# Patient Record
Sex: FEMALE | Race: BLACK OR AFRICAN AMERICAN | NOT HISPANIC OR LATINO | Employment: FULL TIME | URBAN - METROPOLITAN AREA
[De-identification: names, ages, dates, MRNs, and addresses within clinical notes are randomized per-mention and may not be internally consistent; named-entity substitution may affect disease eponyms.]

---

## 2017-12-27 ENCOUNTER — ALLSCRIPTS OFFICE VISIT (OUTPATIENT)
Dept: OTHER | Facility: OTHER | Age: 22
End: 2017-12-27

## 2017-12-28 NOTE — PROGRESS NOTES
Assessment   1  Irregular menstrual cycle (626 4) (N92 6)    Plan   Irregular menstrual cycle    · Tri-Sprintec 0 18/0 215/0 25 MG-35 MCG Oral Tablet; Take one tablet po daily at the    same time   Rx By: Rocky Estrella; Dispense: 0 Days ; #:28 Tablet; Refill: 0;For: Irregular menstrual cycle; MEY = Y; Verified Transmission to [id number] Last Updated By: System, Padloc; 12/27/2017 10:25:15 AM    Discussion/Summary   Discussion Summary:    Will continue tri sprintec as directed and monitor vaginal bleeding  Decreased life stress  Exercise minimum of 150 mins per week  If bleeding persists, will complete pelvic exam (deferred today), possibly thyroid study, TA TV US and/ or vaginal culture  Counseling Documentation With Imm: The patient was counseled regarding instructions for management,-- patient and family education,-- impressions,-- importance of compliance with treatment  total time of encounter was 20 minutes-- and-- 15 minutes was spent counseling  Chief Complaint   Chief Complaint Free Text Note Form: Patient complains of irregular menses  History of Present Illness   HPI: New patient here for problem visit  States that her menses has been irregular x 6 weeks  Unpredictable timing and flow  Taking AMINAH daily x 8-9 year but states the pharmacy changed the birth control to some other brand at the time of her bleeding changing  She went back to her usual birth control 3 days later  She was nauseated with wrong pill  Menarche 5  Coitarche 16 consensual  Never non consensual sex  Lifetime # of sexual partners-7 males  No STI hx  Current male partner x 2 5 years  Previously disliked DMPA due to weight gain and AUB  Tried many other AMINAH's and tri Yoslein Sensor has worked the [employer] [facility] gyn appt was one year ago  Normal pap  No unusual vaginal discharge  Admits to high stress related to school ending, working full time, living with grandparents        Review of Systems   Focused-Female: Constitutional: No fever, no chills, feels well, no tiredness, no recent weight gain or loss  Cardiovascular: no complaints of slow or fast heart rate, no chest pain, no palpitations, no leg claudication or lower extremity edema  Respiratory: no complaints of shortness of breath, no wheezing, no dyspnea on exertion, no orthopnea or PND  Breasts: no complaints of breast pain, breast lump or nipple discharge  Gastrointestinal: no complaints of abdominal pain, no constipation, no nausea or diarrhea, no vomiting, no bloody stools  Genitourinary: unexplained vaginal bleeding, but-- no complaints of dysuria, no incontinence, no pelvic pain, no dysmenorrhea, no vaginal discharge or abnormal vaginal bleeding-- and-- as noted in HPI  Musculoskeletal: no complaints of arthralgia, no myalgia, no joint swelling or stiffness, no limb pain or swelling  Integumentary: no complaints of skin rash or lesion, no itching or dry skin, no skin wounds  Neurological: no complaints of headache, no confusion, no numbness or tingling, no dizziness or fainting  ROS Reviewed:    ROS reviewed  Active Problems   1  Acute viral pharyngitis (462) (J02 8,B97 89)   2  Anxiety (300 00) (F41 9)   3  Birth control counseling (V25 09) (Z30 09)   4  Herpes simplex infection (054 9) (B00 9)   5  Lump of skin (782 2) (R22 9)   6  Need for prophylactic vaccination and inoculation against influenza (V04 81) (Z23)   7  Urinary tract infection (599 0) (N39 0)    Past Medical History   Active Problems And Past Medical History Reviewed: The active problems and past medical history were reviewed and updated today  Surgical History   1  Birth control counseling (V25 09) (Z30 09)   2  History of Oral Surgery Tooth Extraction  Surgical History Reviewed: The surgical history was reviewed and updated today  Family History   Grandmother    1   Family history of chronic obstructive pulmonary disease (V17 6) (Z82 5)  Maternal Grandmother    2  Family history of Asthma (V17 5)  Grandfather    3  Family history of malignant neoplasm of urinary bladder (V16 52) (Z80 52)  Family History    4  Diabetes Mellitus (V18 0)  Family History Reviewed: The family history was reviewed and updated today  NO contact with dad      Social History    · Alcohol use (V49 89) (Z78 9)   · Currently sexually active   · Daily caffeine consumption   · Denied: History of drug use   · Never a smoker  Social History Reviewed: The social history was reviewed and updated today  Current Meds    1  Tri-Sprintec 0 18/0 215/0 25 MG-35 MCG Oral Tablet; Therapy: (Recorded:[id number]) to Recorded  Medication List Reviewed: The medication list was reviewed and updated today  Allergies   1  Acetaminophen PM Ex St TABS  2  Apples    Vitals   Vital Signs    Recorded: [id number] 58:52CL   Systolic 374, LUE, Sitting   Diastolic 72, LUE, Sitting   Height 5 ft 3 in   Weight 144 lb 2 oz   BMI Calculated 25 53   BSA Calculated 1 68   LMP [id number]     Physical Exam   deferred         Signatures    Electronically signed by :  JOJO Christie; Dec 27 2017 10:37AM EST                       (Author)     Electronically signed by : ELVER Cummings ; Dec 27 2017 12:52PM EST                       (Author)

## 2018-01-22 VITALS
DIASTOLIC BLOOD PRESSURE: 72 MMHG | BODY MASS INDEX: 25.54 KG/M2 | SYSTOLIC BLOOD PRESSURE: 108 MMHG | HEIGHT: 63 IN | WEIGHT: 144.13 LBS

## 2018-01-23 NOTE — MISCELLANEOUS
Message  ALEXX WAS SEEN HERE TODAY IN OUR OFFICE TODAY 12/27/2017   Return to work or school:   Felisha Gamboa is under my professional care  She was seen in my office on 12/27/17   She is able to return to work on  She is able to return to school on  She is able to perform activities of daily living without limitations  Aakash URIBE  Signatures  KRISTIN [address]    Electronically signed by :  JOJO Kelly; Dec 27 2017 10:39AM EST                       (Author)

## 2018-05-02 ENCOUNTER — TELEPHONE (OUTPATIENT)
Dept: GYNECOLOGY | Facility: CLINIC | Age: 23
End: 2018-05-02

## 2018-05-03 NOTE — TELEPHONE ENCOUNTER
I prescribed that medication on 12/27/17  I only prescribed one month because she is due for her annual exam and deferred an exam that day  I'm sorry but do not recall when she will be leaving or where to

## 2018-05-07 RX ORDER — NORGESTIMATE AND ETHINYL ESTRADIOL 7DAYSX3 28
KIT ORAL
Qty: 28 TABLET | Refills: 0 | OUTPATIENT
Start: 2018-05-07

## 2018-05-11 ENCOUNTER — TELEPHONE (OUTPATIENT)
Dept: GYNECOLOGY | Facility: CLINIC | Age: 23
End: 2018-05-11

## 2018-05-14 ENCOUNTER — ANNUAL EXAM (OUTPATIENT)
Dept: GYNECOLOGY | Facility: CLINIC | Age: 23
End: 2018-05-14
Payer: COMMERCIAL

## 2018-05-14 VITALS
HEART RATE: 74 BPM | SYSTOLIC BLOOD PRESSURE: 128 MMHG | HEIGHT: 63 IN | DIASTOLIC BLOOD PRESSURE: 76 MMHG | BODY MASS INDEX: 25.98 KG/M2 | WEIGHT: 146.6 LBS

## 2018-05-14 DIAGNOSIS — Z01.419 ENCNTR FOR GYN EXAM (GENERAL) (ROUTINE) W/O ABN FINDINGS: Primary | ICD-10-CM

## 2018-05-14 DIAGNOSIS — Z11.3 ROUTINE SCREENING FOR STI (SEXUALLY TRANSMITTED INFECTION): ICD-10-CM

## 2018-05-14 DIAGNOSIS — Z30.011 ENCOUNTER FOR PRESCRIPTION OF ORAL CONTRACEPTIVES: ICD-10-CM

## 2018-05-14 PROCEDURE — G0145 SCR C/V CYTO,THINLAYER,RESCR: HCPCS | Performed by: NURSE PRACTITIONER

## 2018-05-14 PROCEDURE — 87491 CHLMYD TRACH DNA AMP PROBE: CPT | Performed by: NURSE PRACTITIONER

## 2018-05-14 PROCEDURE — 87591 N.GONORRHOEAE DNA AMP PROB: CPT | Performed by: NURSE PRACTITIONER

## 2018-05-14 PROCEDURE — S0612 ANNUAL GYNECOLOGICAL EXAMINA: HCPCS | Performed by: NURSE PRACTITIONER

## 2018-05-14 RX ORDER — NORGESTIMATE AND ETHINYL ESTRADIOL 7DAYSX3 28
KIT ORAL
COMMUNITY
End: 2018-05-14 | Stop reason: SDUPTHER

## 2018-05-14 RX ORDER — NORGESTIMATE AND ETHINYL ESTRADIOL 7DAYSX3 28
1 KIT ORAL DAILY
Qty: 84 TABLET | Refills: 3 | Status: SHIPPED | OUTPATIENT
Start: 2018-05-14 | End: 2019-06-06 | Stop reason: SDUPTHER

## 2018-05-14 RX ORDER — ACYCLOVIR 200 MG/1
CAPSULE ORAL
COMMUNITY
Start: 2017-03-29 | End: 2019-07-12

## 2018-05-14 RX ORDER — EPINEPHRINE 0.3 MG/.3ML
INJECTION SUBCUTANEOUS
COMMUNITY
Start: 2017-04-24 | End: 2019-07-12

## 2018-05-14 RX ORDER — NORGESTIMATE AND ETHINYL ESTRADIOL 7DAYSX3 28
1 KIT ORAL DAILY
Qty: 84 TABLET | Refills: 3 | Status: SHIPPED | OUTPATIENT
Start: 2018-05-14 | End: 2018-05-14 | Stop reason: SDUPTHER

## 2018-05-14 NOTE — PATIENT INSTRUCTIONS
Pap every 3 year if normal-done, STI testing as indicated-done, exercise most days of week, appropriate diet and hydration, Calcium 1000mg + 600 vit D daily, birth control as directed (ACHES reviewed)  Benefits, risks and alternatives discussed/reviewed  HPV vaccine if indicated  Annual mammogram starting at age 36, monthly BSE  Kegels 20 times twice daily

## 2018-05-14 NOTE — PROGRESS NOTES
Assessment/Plan:    Pap every 3 year if normal-done, STI testing as indicated-done, exercise most days of week, appropriate diet and hydration, Calcium 1000mg + 600 vit D daily, birth control as directed (ACHES reviewed)  Benefits, risks and alternatives discussed/reviewed  HPV vaccine if indicated  Annual mammogram starting at age 35, monthly BSE  Kegels 20 times twice daily  Diagnoses and all orders for this visit:    Encntr for gyn exam (general) (routine) w/o abn findings  -     Liquid-based pap, screening    Encounter for prescription of oral contraceptives  -     Discontinue: norgestimate-ethinyl estradiol (TRI-SPRINTEC) 0 18/0 215/0 25 MG-35 MCG per tablet; Take 1 tablet by mouth daily  -     norgestimate-ethinyl estradiol (TRI-SPRINTEC) 0 18/0 215/0 25 MG-35 MCG per tablet; Take 1 tablet by mouth daily    Routine screening for STI (sexually transmitted infection)  -     Chlamydia/GC amplified DNA by PCR    Other orders  -     acyclovir (ZOVIRAX) 200 mg capsule; TAKE 1 CAPSULE 3 TIMES A DAY FOR 10 DAYS  -     EPINEPHrine (EPIPEN 2-ANGELA) 0 3 mg/0 3 mL SOAJ; For a severe reaction: Inject in outer thigh following instructions on package and go to the Emergency room  -     Discontinue: norgestimate-ethinyl estradiol (TRI-SPRINTEC) 0 18/0 215/0 25 MG-35 MCG per tablet; Take by mouth          Subjective:      Patient ID: Ilene Mustafa is a 20 y o  female  Patient here for annual visit  No health concerns  Moving to [facility] for [facility]  Works for [employer]  Monthly menses x 3-4 days with heavy flow x 1 day then mod  Sexually active with male partner x 3 years  He is also moving with her and he got a job in [employer]  Admits to extreme anxiety to "anything vagina related " I almost pass out with pelvic exam  No anxiety with intimate contact with partner  Hx of HSV around her eyes which is triggered by stress             The following portions of the patient's history were reviewed and updated as appropriate: allergies, current medications, past family history, past medical history, past social history, past surgical history and problem list     Review of Systems   Constitutional: Negative  Negative for activity change, appetite change, chills, diaphoresis, fatigue, fever and unexpected weight change  HENT: Negative for congestion, dental problem, sneezing, sore throat and trouble swallowing  Eyes: Negative for visual disturbance  Respiratory: Negative for chest tightness and shortness of breath  Cardiovascular: Negative for chest pain and leg swelling  Gastrointestinal: Negative for abdominal pain, constipation, diarrhea, nausea and vomiting  Genitourinary: Negative for difficulty urinating, dyspareunia, dysuria, frequency, hematuria, menstrual problem, pelvic pain, urgency, vaginal bleeding, vaginal discharge and vaginal pain  Musculoskeletal: Negative for back pain and neck pain  Skin: Negative  Allergic/Immunologic: Negative  Neurological: Negative for weakness and headaches  Hematological: Negative for adenopathy  Psychiatric/Behavioral: Negative  Objective: There were no vitals taken for this visit  Physical Exam   Constitutional: She is oriented to person, place, and time  She appears well-developed and well-nourished  Extremely guarded during pelvic exam  Utilized breathing techniques to get through exam     HENT:   Head: Normocephalic and atraumatic  Eyes: Right eye exhibits no discharge  Left eye exhibits no discharge  Neck: Normal range of motion  Neck supple  Cardiovascular: Normal rate, regular rhythm, normal heart sounds and intact distal pulses  Pulmonary/Chest: Effort normal and breath sounds normal    Abdominal: Soft  Genitourinary: Vagina normal and uterus normal  Rectal exam shows no external hemorrhoid  No breast swelling, tenderness, discharge or bleeding  No labial fusion   There is no rash, tenderness, lesion or injury on the right labia  There is no rash, tenderness, lesion or injury on the left labia  Cervix exhibits no motion tenderness, no discharge and no friability  Right adnexum displays no mass, no tenderness and no fullness  Left adnexum displays no mass, no tenderness and no fullness  No erythema, tenderness or bleeding in the vagina  No foreign body in the vagina  No signs of injury around the vagina  No vaginal discharge found  Genitourinary Comments: Partial view of cervix  Pap attempted  Patient aware we may have insufficient cells  Musculoskeletal: Normal range of motion  Lymphadenopathy:     She has no cervical adenopathy  Right: No inguinal adenopathy present  Left: No inguinal adenopathy present  Neurological: She is alert and oriented to person, place, and time  Skin: Skin is warm and dry  Psychiatric: She has a normal mood and affect  Nursing note and vitals reviewed

## 2018-05-16 LAB
CHLAMYDIA DNA CVX QL NAA+PROBE: NORMAL
N GONORRHOEA DNA GENITAL QL NAA+PROBE: NORMAL

## 2018-05-17 LAB
LAB AP GYN PRIMARY INTERPRETATION: NORMAL
Lab: NORMAL

## 2018-08-28 ENCOUNTER — TELEPHONE (OUTPATIENT)
Dept: GYNECOLOGY | Facility: CLINIC | Age: 23
End: 2018-08-28

## 2018-09-03 ENCOUNTER — DOCUMENTATION (OUTPATIENT)
Dept: LABOR AND DELIVERY | Facility: HOSPITAL | Age: 23
End: 2018-09-03

## 2018-09-03 DIAGNOSIS — Z30.011 ENCOUNTER FOR PRESCRIPTION OF ORAL CONTRACEPTIVES: ICD-10-CM

## 2019-02-04 ENCOUNTER — TELEPHONE (OUTPATIENT)
Dept: GYNECOLOGY | Facility: CLINIC | Age: 24
End: 2019-02-04

## 2019-02-04 NOTE — TELEPHONE ENCOUNTER
Patient aware you will be in office tomorrow, she would like to speak with you about her BCP  Thinks she may have sexual side effects with this

## 2019-02-05 ENCOUNTER — TELEPHONE (OUTPATIENT)
Dept: GYNECOLOGY | Facility: CLINIC | Age: 24
End: 2019-02-05

## 2019-02-05 NOTE — TELEPHONE ENCOUNTER
Spoke with patient about her concerns about decreased libido-discussed causes and suggestions given to increase desire

## 2019-04-16 DIAGNOSIS — Z30.011 ENCOUNTER FOR PRESCRIPTION OF ORAL CONTRACEPTIVES: ICD-10-CM

## 2019-04-17 DIAGNOSIS — Z30.011 ENCOUNTER FOR PRESCRIPTION OF ORAL CONTRACEPTIVES: ICD-10-CM

## 2019-04-17 RX ORDER — NORGESTIMATE AND ETHINYL ESTRADIOL 7DAYSX3 28
KIT ORAL
Qty: 84 TABLET | Refills: 3 | OUTPATIENT
Start: 2019-04-17

## 2019-05-28 DIAGNOSIS — Z30.011 ENCOUNTER FOR PRESCRIPTION OF ORAL CONTRACEPTIVES: ICD-10-CM

## 2019-05-29 RX ORDER — NORGESTIMATE AND ETHINYL ESTRADIOL 7DAYSX3 28
KIT ORAL
Qty: 28 TABLET | Refills: 0 | OUTPATIENT
Start: 2019-05-29

## 2019-06-06 DIAGNOSIS — Z30.011 ENCOUNTER FOR PRESCRIPTION OF ORAL CONTRACEPTIVES: ICD-10-CM

## 2019-06-06 RX ORDER — NORGESTIMATE AND ETHINYL ESTRADIOL 7DAYSX3 28
1 KIT ORAL DAILY
Qty: 28 TABLET | Refills: 0 | Status: SHIPPED | OUTPATIENT
Start: 2019-06-06 | End: 2019-07-11 | Stop reason: SDUPTHER

## 2019-06-06 RX ORDER — NORGESTIMATE AND ETHINYL ESTRADIOL 7DAYSX3 28
KIT ORAL
Qty: 28 TABLET | Refills: 0 | OUTPATIENT
Start: 2019-06-06

## 2019-07-12 ENCOUNTER — ANNUAL EXAM (OUTPATIENT)
Dept: GYNECOLOGY | Facility: CLINIC | Age: 24
End: 2019-07-12
Payer: COMMERCIAL

## 2019-07-12 ENCOUNTER — OFFICE VISIT (OUTPATIENT)
Dept: INTERNAL MEDICINE CLINIC | Facility: CLINIC | Age: 24
End: 2019-07-12
Payer: COMMERCIAL

## 2019-07-12 VITALS
HEIGHT: 63 IN | DIASTOLIC BLOOD PRESSURE: 76 MMHG | SYSTOLIC BLOOD PRESSURE: 118 MMHG | WEIGHT: 140.4 LBS | HEART RATE: 75 BPM | BODY MASS INDEX: 24.88 KG/M2

## 2019-07-12 VITALS
BODY MASS INDEX: 24.63 KG/M2 | TEMPERATURE: 98.4 F | OXYGEN SATURATION: 98 % | HEIGHT: 63 IN | HEART RATE: 70 BPM | SYSTOLIC BLOOD PRESSURE: 128 MMHG | DIASTOLIC BLOOD PRESSURE: 84 MMHG | WEIGHT: 139 LBS

## 2019-07-12 DIAGNOSIS — Z00.00 ROUTINE GENERAL MEDICAL EXAMINATION AT A HEALTH CARE FACILITY: ICD-10-CM

## 2019-07-12 DIAGNOSIS — T78.2XXS ANAPHYLAXIS, SEQUELA: Primary | ICD-10-CM

## 2019-07-12 DIAGNOSIS — Z30.011 ENCOUNTER FOR PRESCRIPTION OF ORAL CONTRACEPTIVES: ICD-10-CM

## 2019-07-12 DIAGNOSIS — Z01.419 ENCNTR FOR GYN EXAM (GENERAL) (ROUTINE) W/O ABN FINDINGS: Primary | ICD-10-CM

## 2019-07-12 DIAGNOSIS — Z11.3 ROUTINE SCREENING FOR STI (SEXUALLY TRANSMITTED INFECTION): ICD-10-CM

## 2019-07-12 PROCEDURE — 99395 PREV VISIT EST AGE 18-39: CPT | Performed by: NURSE PRACTITIONER

## 2019-07-12 PROCEDURE — S0612 ANNUAL GYNECOLOGICAL EXAMINA: HCPCS | Performed by: NURSE PRACTITIONER

## 2019-07-12 PROCEDURE — 87591 N.GONORRHOEAE DNA AMP PROB: CPT | Performed by: NURSE PRACTITIONER

## 2019-07-12 PROCEDURE — 87491 CHLMYD TRACH DNA AMP PROBE: CPT | Performed by: NURSE PRACTITIONER

## 2019-07-12 RX ORDER — NORGESTIMATE AND ETHINYL ESTRADIOL 7DAYSX3 28
1 KIT ORAL DAILY
Qty: 84 TABLET | Refills: 3 | Status: SHIPPED | OUTPATIENT
Start: 2019-07-12 | End: 2020-04-10 | Stop reason: SDUPTHER

## 2019-07-12 RX ORDER — EPINEPHRINE 0.3 MG/.3ML
0.3 INJECTION SUBCUTANEOUS ONCE
Qty: 0.6 ML | Refills: 0 | Status: SHIPPED | OUTPATIENT
Start: 2019-07-12 | End: 2020-07-17 | Stop reason: SDUPTHER

## 2019-07-12 NOTE — PROGRESS NOTES
ADULT ANNUAL PHYSICAL  [facility] - [facility] INTERNAL MEDICINE    NAME: Melanie Huffman  AGE: 24 y o  SEX: female  : 1995     DATE: 2019     Assessment and Plan:     Problem List Items Addressed This Visit        Other    Routine general medical examination at a health care facility     Normal exam of healthy adult female  She is utd with gyn care and cervical cancer screenings  Regular dental care and eye exams  Encouraged beginning regular exercise program to maintain her healthy bmi  Immunizations utd  Other Visit Diagnoses     Anaphylaxis, sequela    -  Primary    Relevant Medications    EPINEPHrine (EPIPEN) 0 3 mg/0 3 mL SOAJ          Immunizations and preventive care screenings were discussed with patient today  Appropriate education was printed on patient's after visit summary  Counseling:  · Injury prevention: discussed safety/seat belts, safety helmets, smoke detectors, carbon dioxide detectors, and smoking near bedding or upholstery  No follow-ups on file  Chief Complaint:     Chief Complaint   Patient presents with   Brando Roles Establish Care    Sciatica      History of Present Illness:     Adult Annual Physical   Patient here for a comprehensive physical exam  The patient reports no problems  Diet and Physical Activity  · Diet/Nutrition: well balanced diet and consuming 3-5 servings of fruits/vegetables daily  · Exercise: no formal exercise  Depression Screening  PHQ-9 Depression Screening    PHQ-9:    Frequency of the following problems over the past two weeks:       Little interest or pleasure in doing things:  1 - several days  Feeling down, depressed, or hopeless:  1 - several days  PHQ-2 Score:  2       General Health  · Sleep: sleeps well and gets 4-6 hours of sleep on average  · Hearing: normal - bilateral   · Vision: no vision problems, most recent eye exam <1 year ago and wears glasses     · Dental: regular dental visits and brushes teeth twice daily  /GYN Health  · Last menstrual period: 7/3/19  · Contraceptive method: oral contraceptives  · History of STDs?: no      Review of Systems:     Review of Systems   Constitutional: Negative for activity change, appetite change, chills, fatigue, fever and unexpected weight change  HENT: Negative for hearing loss  Eyes: Negative for visual disturbance  Respiratory: Negative for cough, chest tightness and shortness of breath  Cardiovascular: Negative for chest pain, palpitations and leg swelling  Gastrointestinal: Negative for constipation, diarrhea, nausea and vomiting  Genitourinary: Negative for dysuria, frequency and menstrual problem  Musculoskeletal: Negative for arthralgias and myalgias  Allergic/Immunologic: Negative for environmental allergies  Neurological: Negative for dizziness, weakness, numbness and headaches  Psychiatric/Behavioral: Negative for dysphoric mood and sleep disturbance  The patient is not nervous/anxious  Past Medical History:     Past Medical History:   Diagnosis Date    Allergic     Anxiety       Past Surgical History:     Past Surgical History:   Procedure Laterality Date    WISDOM TOOTH EXTRACTION      WISDOM TOOTH EXTRACTION        Social History:     Social History     Socioeconomic History    Marital status: Single     Spouse name: None    Number of children: None    Years of education: None    Highest education level: None   Occupational History    None   Social Needs    Financial resource strain: None    Food insecurity:     Worry: None     Inability: None    Transportation needs:     Medical: None     Non-medical: None   Tobacco Use    Smoking status: Never Smoker    Smokeless tobacco: Never Used   Substance and Sexual Activity    Alcohol use: Yes     Comment: occas  , typically drinks on weekend; 5 beers on one day    Drug use: No    Sexual activity: Yes     Partners: Male     Birth control/protection: OCP     Comment: Male partner x 3 years; Keith   Lifestyle    Physical activity:     Days per week: None     Minutes per session: None    Stress: None   Relationships    Social connections:     Talks on phone: None     Gets together: None     Attends [employer] service: None     Active member of club or organization: None     Attends meetings of clubs or organizations: None     Relationship status: None    Intimate partner violence:     Fear of current or ex partner: None     Emotionally abused: None     Physically abused: None     Forced sexual activity: None   Other Topics Concern    None   Social History Narrative    Lives with her boyfriend    Works full time     Exercise: none reported    Diet: well balanced diet during the week    Caffeine: 3 cups coffee per day       Family History:     Family History   Problem Relation Age of Onset    No Known Problems Mother     No Known Problems Father     No Known Problems Sister     No Known Problems Brother     COPD Maternal Grandmother     Asthma Maternal Grandmother     Melanoma Maternal Grandmother     Cancer Maternal Grandfather         Bladder    Melanoma Maternal Grandfather     Diabetes Maternal Grandfather     No Known Problems Paternal Grandmother     No Known Problems Paternal Grandfather     No Known Problems Sister     No Known Problems Sister       Current Medications:     Current Outpatient Medications   Medication Sig Dispense Refill    norgestimate-ethinyl estradiol (TRI-SPRINTEC) 0 18/0 215/0 25 MG-35 MCG per tablet Take 1 tablet by mouth daily 84 tablet 3    EPINEPHrine (EPIPEN) 0 3 mg/0 3 mL SOAJ Inject 0 3 mL (0 3 mg total) into a muscle once for 1 dose 0 6 mL 0     No current facility-administered medications for this visit  Allergies:      Allergies   Allergen Reactions    Acetaminophen Hives     Any blue dye tyenlol, regular tylenol without dye is fine    Apple Itching    Methylphenidate Other (See Comments)    Other      Grass, tree nuts       Physical Exam:     /84 (BP Location: Left arm, Patient Position: Sitting, Cuff Size: Adult)   Pulse 70   Temp 98 4 °F (36 9 °C) (Tympanic)   Ht 5' 3" (1 6 m)   Wt 63 kg (139 lb)   LMP 07/03/2019   SpO2 98%   BMI 24 62 kg/m²     Physical Exam   Constitutional: She is oriented to person, place, and time  Vital signs are normal  She appears well-developed and well-nourished  She is cooperative  HENT:   Right Ear: Hearing, tympanic membrane, external ear and ear canal normal    Left Ear: Hearing, tympanic membrane, external ear and ear canal normal    Nose: Nose normal  No mucosal edema  Mouth/Throat: Uvula is midline, oropharynx is clear and moist and mucous membranes are normal    Eyes: Pupils are equal, round, and reactive to light  Conjunctivae and lids are normal    Neck: Normal range of motion  No JVD present  Carotid bruit is not present  No thyromegaly present  Cardiovascular: Normal rate, regular rhythm, normal heart sounds and intact distal pulses  No murmur heard  Pulmonary/Chest: Effort normal and breath sounds normal  No respiratory distress  Abdominal: Soft  Normal appearance and bowel sounds are normal  There is no tenderness  Musculoskeletal: Normal range of motion  She exhibits no edema  Lymphadenopathy:     She has no cervical adenopathy  Neurological: She is alert and oriented to person, place, and time  She has normal strength and normal reflexes  She displays normal reflexes  No sensory deficit  Skin: Skin is warm, dry and intact  Psychiatric: She has a normal mood and affect  Her speech is normal and behavior is normal  Judgment and thought content normal  Cognition and memory are normal    Vitals reviewed        Marita Haywood, [address] INTERNAL MEDICINE

## 2019-07-12 NOTE — PROGRESS NOTES
Assessment/Plan:    No problem-specific Assessment & Plan notes found for this encounter  There are no diagnoses linked to this encounter  Subjective:      Patient ID: Sami Matos is a 24 y o  female      HPI    The following portions of the patient's history were reviewed and updated as appropriate: allergies, current medications, past family history, past medical history, past social history, past surgical history and problem list     Review of Systems      Objective:      /84 (BP Location: Left arm, Patient Position: Sitting, Cuff Size: Adult)   Pulse 70   Temp 98 4 °F (36 9 °C) (Tympanic)   Ht 5' 3" (1 6 m)   Wt 63 kg (139 lb)   LMP 07/03/2019   SpO2 98%   BMI 24 62 kg/m²          Physical Exam

## 2019-07-12 NOTE — PATIENT INSTRUCTIONS
Pap every 3 years if normal-due 2021, STI testing as indicated-GC/CT done, exercise most days of week, appropriate diet and hydration, Calcium 1000mg + 600 vit D daily, birth control as directed (ACHES reviewed)  Benefits, risks and alternatives discussed/reviewed  HPV vaccine series possibly completed  Annual mammogram starting at age 36, monthly BSE  Will consider referral to PF PT for persistent gluteal pain if not relieved by other methods  She will follow with PCP today to discuss

## 2019-07-12 NOTE — PROGRESS NOTES
Assessment/Plan:     Pap every 3 years if normal-due 2021, STI testing as indicated-GC/CT done, exercise most days of week, appropriate diet and hydration, Calcium 1000mg + 600 vit D daily, birth control as directed (ACHES reviewed)  Benefits, risks and alternatives discussed/reviewed  HPV vaccine series possibly completed  Annual mammogram starting at age 35, monthly BSE  Will consider referral to PF PT for persistent gluteal pain if not relieved by other methods  She will follow with PCP today to discuss  Negative depression screen  Diagnoses and all orders for this visit:    Encntr for gyn exam (general) (routine) w/o abn findings    Encounter for prescription of oral contraceptives  -     norgestimate-ethinyl estradiol (TRI-SPRINTEC) 0 18/0 215/0 25 MG-35 MCG per tablet; Take 1 tablet by mouth daily    Routine screening for STI (sexually transmitted infection)  -     Chlamydia/GC amplified DNA by PCR    Other orders  -     Cancel: Liquid-based pap, screening              Subjective:      Patient ID: Sasha Carty is a 24 y o  female  Sasha Carty is a 24 y o  female who is here today for her annual visit  C/o right intermittent gluteal discomfort at random time  Pain described as sharp, poking rating pain 6/10  No meds taken  Walking lessens pain  Aware to follow up with PCP  Monthly menses x 4 days with 1 heavy day, then light with increased clots flow  Menses is tolerable but different  Denies menstrual cramps  Sasha Carty is sexually active with male partner of 3 5 years  Not using condoms, No hx of STI's  Normal pap 5/14/18  Exercises 2 times per month  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  The following portions of the patient's history were reviewed and updated as appropriate: allergies, current medications, past family history, past medical history, past social history, past surgical history and problem list     Review of Systems   Constitutional: Negative  Negative for activity change, appetite change, chills, diaphoresis, fatigue, fever and unexpected weight change  HENT: Positive for congestion (admits to seasonal allergies, no meds taken)  Negative for dental problem, sneezing, sore throat and trouble swallowing  C/o allergies/ all year round  Pt does not take anything for the allergies    Eyes: Positive for visual disturbance (followed with opthal  and had new eye glass prescription)  C/o blurry vision, has seen eye doctor recently and prescribed new glasses    Respiratory: Negative for chest tightness and shortness of breath  Cardiovascular: Negative for chest pain and leg swelling  Gastrointestinal: Negative for abdominal pain, constipation, diarrhea, nausea and vomiting  Genitourinary: Negative for difficulty urinating, dyspareunia, dysuria, frequency, hematuria, menstrual problem, pelvic pain, urgency, vaginal bleeding, vaginal discharge and vaginal pain  Musculoskeletal: Positive for back pain  Negative for neck pain  C/o lower right buttock pain occasionally  6-7/10 upon waking, seems to get better with ambulation     Skin: Negative  Allergic/Immunologic: Negative  Neurological: Negative for weakness and headaches  Hematological: Negative for adenopathy  Psychiatric/Behavioral: Negative  Objective:      /76 (BP Location: Left arm, Patient Position: Sitting, Cuff Size: Standard)   Pulse 75   Ht 5' 3" (1 6 m)   Wt 63 7 kg (140 lb 6 4 oz)   LMP 07/03/2019   BMI 24 87 kg/m²          Physical Exam   Constitutional: She is oriented to person, place, and time  Vital signs are normal  She appears well-developed and well-nourished  HENT:   Head: Normocephalic and atraumatic  Eyes: Right eye exhibits no discharge  Left eye exhibits no discharge  Neck: Trachea normal and normal range of motion  Neck supple  No thyromegaly present     Cardiovascular: Normal rate, regular rhythm, normal heart sounds and intact distal pulses  Pulmonary/Chest: Effort normal and breath sounds normal  Right breast exhibits no inverted nipple, no mass, no nipple discharge, no skin change and no tenderness  Left breast exhibits no inverted nipple, no mass, no nipple discharge, no skin change and no tenderness  No breast tenderness, discharge or bleeding  Breasts are symmetrical    Abdominal: Soft  Normal appearance  Genitourinary: Vagina normal and uterus normal  Rectal exam shows no external hemorrhoid  No breast tenderness, discharge or bleeding  Pelvic exam was performed with patient supine  No labial fusion  There is no rash, tenderness, lesion or injury on the right labia  There is no rash, tenderness, lesion or injury on the left labia  Cervix exhibits no motion tenderness, no discharge and no friability  Right adnexum displays no mass, no tenderness and no fullness  Left adnexum displays no mass, no tenderness and no fullness  No erythema, tenderness or bleeding in the vagina  No foreign body in the vagina  No signs of injury around the vagina  No vaginal discharge found  Genitourinary Comments: Guarding during pelvic exam; needing relaxation techniques to get through the discomfort   Musculoskeletal: Normal range of motion  Lymphadenopathy:        Head (right side): No submental, no submandibular and no tonsillar adenopathy present  Head (left side): No submental, no submandibular and no tonsillar adenopathy present  She has no cervical adenopathy  She has no axillary adenopathy  No inguinal adenopathy noted on the right or left side  Right: No inguinal adenopathy present  Left: No inguinal adenopathy present  Neurological: She is alert and oriented to person, place, and time  Skin: Skin is warm and dry  Psychiatric: She has a normal mood and affect  Nursing note and vitals reviewed

## 2019-07-12 NOTE — ASSESSMENT & PLAN NOTE
Normal exam of healthy adult female  She is utd with gyn care and cervical cancer screenings  Regular dental care and eye exams  Encouraged beginning regular exercise program to maintain her healthy bmi  Immunizations utd

## 2019-07-15 LAB
C TRACH DNA SPEC QL NAA+PROBE: NEGATIVE
N GONORRHOEA DNA SPEC QL NAA+PROBE: NEGATIVE

## 2019-10-21 ENCOUNTER — APPOINTMENT (OUTPATIENT)
Dept: LAB | Age: 24
End: 2019-10-21
Attending: PREVENTIVE MEDICINE

## 2019-10-21 ENCOUNTER — TRANSCRIBE ORDERS (OUTPATIENT)
Dept: ADMINISTRATIVE | Age: 24
End: 2019-10-21

## 2019-10-21 DIAGNOSIS — Z02.1 ENCOUNTER FOR PRE-EMPLOYMENT HEALTH SCREENING EXAMINATION: Primary | ICD-10-CM

## 2019-10-21 DIAGNOSIS — Z02.1 ENCOUNTER FOR PRE-EMPLOYMENT HEALTH SCREENING EXAMINATION: ICD-10-CM

## 2019-10-21 LAB — RUBV IGG SERPL IA-ACNC: 8.7 IU/ML

## 2019-10-21 PROCEDURE — 36415 COLL VENOUS BLD VENIPUNCTURE: CPT

## 2019-10-21 PROCEDURE — 86787 VARICELLA-ZOSTER ANTIBODY: CPT

## 2019-10-21 PROCEDURE — 86480 TB TEST CELL IMMUN MEASURE: CPT

## 2019-10-21 PROCEDURE — 86735 MUMPS ANTIBODY: CPT

## 2019-10-21 PROCEDURE — 86765 RUBEOLA ANTIBODY: CPT

## 2019-10-21 PROCEDURE — 86762 RUBELLA ANTIBODY: CPT

## 2019-10-22 LAB
MEV IGG SER QL: NORMAL
MUV IGG SER QL: ABNORMAL

## 2019-10-23 LAB
GAMMA INTERFERON BACKGROUND BLD IA-ACNC: 0.07 IU/ML
M TB IFN-G BLD-IMP: NEGATIVE
M TB IFN-G CD4+ BCKGRND COR BLD-ACNC: -0.03 IU/ML
M TB IFN-G CD4+ BCKGRND COR BLD-ACNC: -0.03 IU/ML
MITOGEN IGNF BCKGRD COR BLD-ACNC: >10 IU/ML

## 2019-10-24 LAB — VZV IGG SER IA-ACNC: NORMAL

## 2020-02-14 ENCOUNTER — OFFICE VISIT (OUTPATIENT)
Dept: URGENT CARE | Age: 25
End: 2020-02-14
Payer: COMMERCIAL

## 2020-02-14 VITALS
HEART RATE: 90 BPM | DIASTOLIC BLOOD PRESSURE: 65 MMHG | WEIGHT: 140 LBS | BODY MASS INDEX: 24.8 KG/M2 | RESPIRATION RATE: 16 BRPM | HEIGHT: 63 IN | OXYGEN SATURATION: 98 % | TEMPERATURE: 99.7 F | SYSTOLIC BLOOD PRESSURE: 119 MMHG

## 2020-02-14 DIAGNOSIS — J02.0 STREP PHARYNGITIS: Primary | ICD-10-CM

## 2020-02-14 PROCEDURE — G0382 LEV 3 HOSP TYPE B ED VISIT: HCPCS | Performed by: PHYSICIAN ASSISTANT

## 2020-02-14 RX ORDER — AMOXICILLIN 500 MG/1
500 CAPSULE ORAL EVERY 8 HOURS SCHEDULED
Qty: 30 CAPSULE | Refills: 0 | Status: SHIPPED | OUTPATIENT
Start: 2020-02-14 | End: 2020-02-24

## 2020-02-14 NOTE — PROGRESS NOTES
[address] [facility]        NAME: Hosea Wadsworth is a 21 y o  female  : 1995    MRN: [id number]  DATE: 2020  TIME: 3:11 PM    Assessment and Plan   Strep pharyngitis [J02 0]  1  Strep pharyngitis  amoxicillin (AMOXIL) 500 mg capsule         Patient Instructions       Follow up with PCP in 3-5 days  Proceed to  ER if symptoms worsen  Chief Complaint   No chief complaint on file  History of Present Illness       Patient here for evaluation redness swelling and exudate of her left tonsil with swollen glands  Patient's symptoms started yesterday and she fell little bit tired and fatigued  She denies any known fevers, chills, body aches, headache, cough  Review of Systems   Review of Systems   Constitutional: Negative  HENT: Positive for sore throat  Negative for congestion, ear pain, postnasal drip, rhinorrhea, sinus pressure, sinus pain, sneezing, trouble swallowing and voice change  Eyes: Negative  Respiratory: Negative  Cardiovascular: Negative            Current Medications       Current Outpatient Medications:     amoxicillin (AMOXIL) 500 mg capsule, Take 1 capsule (500 mg total) by mouth every 8 (eight) hours for 10 days, Disp: 30 capsule, Rfl: 0    EPINEPHrine (EPIPEN) 0 3 mg/0 3 mL SOAJ, Inject 0 3 mL (0 3 mg total) into a muscle once for 1 dose, Disp: 0 6 mL, Rfl: 0    norgestimate-ethinyl estradiol (TRI-SPRINTEC) 0 18/0 215/0 25 MG-35 MCG per tablet, Take 1 tablet by mouth daily, Disp: 84 tablet, Rfl: 3    Current Allergies     Allergies as of 2020 - Reviewed 2019   Allergen Reaction Noted    Acetaminophen Hives 2016    Apple Itching 2016    Methylphenidate Other (See Comments) 2017    Other  2018            The following portions of the patient's history were reviewed and updated as appropriate: allergies, current medications, past family history, past medical history, past social history, past surgical history and problem list      Past Medical History:   Diagnosis Date    Allergic     Anxiety        Past Surgical History:   Procedure Laterality Date    WISDOM TOOTH EXTRACTION      WISDOM TOOTH EXTRACTION         Family History   Problem Relation Age of Onset    No Known Problems Mother     No Known Problems Father     No Known Problems Sister     No Known Problems Brother     COPD Maternal Grandmother     Asthma Maternal Grandmother     Melanoma Maternal Grandmother     Cancer Maternal Grandfather         Bladder    Melanoma Maternal Grandfather     Diabetes Maternal Grandfather     No Known Problems Paternal Grandmother     No Known Problems Paternal Grandfather     No Known Problems Sister     No Known Problems Sister          Medications have been verified  Objective   /65   Pulse 90   Temp 99 7 °F (37 6 °C)   Resp 16   Ht 5' 3" (1 6 m)   Wt 63 5 kg (140 lb)   LMP 02/14/2020   SpO2 98%   BMI 24 80 kg/m²        Physical Exam     Physical Exam   Constitutional: She is oriented to person, place, and time  She appears well-developed and well-nourished  No distress  HENT:   Head: Normocephalic and atraumatic  Mouth/Throat: Oropharyngeal exudate (Left tonsil) present  Left tonsillar erythema with +2 soft tissue swelling  Lymphadenopathy:     She has cervical adenopathy  Neurological: She is alert and oriented to person, place, and time  Skin: Skin is warm and dry  No rash noted  She is not diaphoretic  Psychiatric: She has a normal mood and affect  Her behavior is normal  Judgment and thought content normal    Nursing note and vitals reviewed

## 2020-04-10 DIAGNOSIS — Z30.011 ENCOUNTER FOR PRESCRIPTION OF ORAL CONTRACEPTIVES: ICD-10-CM

## 2020-04-10 RX ORDER — NORGESTIMATE AND ETHINYL ESTRADIOL 7DAYSX3 28
1 KIT ORAL DAILY
Qty: 84 TABLET | Refills: 0 | Status: SHIPPED | OUTPATIENT
Start: 2020-04-10 | End: 2020-09-09 | Stop reason: SDUPTHER

## 2020-05-11 ENCOUNTER — OFFICE VISIT (OUTPATIENT)
Dept: URGENT CARE | Age: 25
End: 2020-05-11
Payer: COMMERCIAL

## 2020-05-11 DIAGNOSIS — Z20.822 EXPOSURE TO COVID-19 VIRUS: Primary | ICD-10-CM

## 2020-05-11 PROCEDURE — G0380 LEV 1 HOSP TYPE B ED VISIT: HCPCS | Performed by: PHYSICIAN ASSISTANT

## 2020-05-11 PROCEDURE — U0003 INFECTIOUS AGENT DETECTION BY NUCLEIC ACID (DNA OR RNA); SEVERE ACUTE RESPIRATORY SYNDROME CORONAVIRUS 2 (SARS-COV-2) (CORONAVIRUS DISEASE [COVID-19]), AMPLIFIED PROBE TECHNIQUE, MAKING USE OF HIGH THROUGHPUT TECHNOLOGIES AS DESCRIBED BY CMS-2020-01-R: HCPCS | Performed by: PHYSICIAN ASSISTANT

## 2020-05-13 ENCOUNTER — TELEPHONE (OUTPATIENT)
Dept: URGENT CARE | Age: 25
End: 2020-05-13

## 2020-05-13 LAB
INPATIENT: NORMAL
SARS-COV-2 RNA SPEC QL NAA+PROBE: NOT DETECTED

## 2020-07-17 ENCOUNTER — OFFICE VISIT (OUTPATIENT)
Dept: INTERNAL MEDICINE CLINIC | Facility: CLINIC | Age: 25
End: 2020-07-17
Payer: COMMERCIAL

## 2020-07-17 VITALS
SYSTOLIC BLOOD PRESSURE: 122 MMHG | BODY MASS INDEX: 25.34 KG/M2 | OXYGEN SATURATION: 98 % | HEART RATE: 78 BPM | RESPIRATION RATE: 16 BRPM | DIASTOLIC BLOOD PRESSURE: 81 MMHG | WEIGHT: 143 LBS | TEMPERATURE: 99.4 F | HEIGHT: 63 IN

## 2020-07-17 DIAGNOSIS — Z00.00 ANNUAL PHYSICAL EXAM: Primary | ICD-10-CM

## 2020-07-17 DIAGNOSIS — G47.00 INSOMNIA, UNSPECIFIED TYPE: ICD-10-CM

## 2020-07-17 DIAGNOSIS — E66.3 OVERWEIGHT WITH BODY MASS INDEX (BMI) OF 25 TO 25.9 IN ADULT: ICD-10-CM

## 2020-07-17 DIAGNOSIS — T78.2XXS ANAPHYLAXIS, SEQUELA: ICD-10-CM

## 2020-07-17 PROCEDURE — 99395 PREV VISIT EST AGE 18-39: CPT | Performed by: NURSE PRACTITIONER

## 2020-07-17 RX ORDER — TRAZODONE HYDROCHLORIDE 50 MG/1
50 TABLET ORAL
Qty: 5 TABLET | Refills: 0 | Status: SHIPPED | OUTPATIENT
Start: 2020-07-17 | End: 2021-07-19 | Stop reason: SDDI

## 2020-07-17 RX ORDER — EPINEPHRINE 0.3 MG/.3ML
0.3 INJECTION SUBCUTANEOUS ONCE
Qty: 2 EACH | Refills: 0 | Status: SHIPPED | OUTPATIENT
Start: 2020-07-17 | End: 2020-07-17

## 2020-07-17 NOTE — ASSESSMENT & PLAN NOTE
Pt has a history of a very disrupted sleep pattern  She states she awakens from sleep every 2 hours and wakes feeling unrested  She is very anxious about taking medications but she feels willing to try taking something mild to see if she can improve her sleep pattern  Would like her to try trazodone 50 mg and see if she tolerates this or if it helps  Will let me know if she wishes to continue with it

## 2020-07-17 NOTE — PROGRESS NOTES
[address] [facility]    NAME: Maranda Dance  AGE: 21 y o  SEX: female  : 1995     DATE: 2020     Assessment and Plan:     Problem List Items Addressed This Visit        Other    Annual physical exam - Primary     Normal exam of healthy adult female  She is utd with gyn screenings, eye exams, dental ppx  Recommend annual physicals  Insomnia     Pt has a history of a very disrupted sleep pattern  She states she awakens from sleep every 2 hours and wakes feeling unrested  She is very anxious about taking medications but she feels willing to try taking something mild to see if she can improve her sleep pattern  Would like her to try trazodone 50 mg and see if she tolerates this or if it helps  Will let me know if she wishes to continue with it  Relevant Medications    traZODone (DESYREL) 50 mg tablet    Overweight with body mass index (BMI) of 25 to 25 9 in adult     BMI is 25 33  She does feel that since covid started, she has been eating poorly, getting more take out  She does not have time for exercise but is not sedentary  Encouraged working on dietary balance  BMI Counseling: Body mass index is 25 33 kg/m²  The BMI is above normal  Nutrition recommendations include reducing portion sizes, decreasing overall calorie intake, 3-5 servings of fruits/vegetables daily and reducing fast food intake  Exercise recommendations include vigorous aerobic physical activity for 75 minutes/week  Other Visit Diagnoses     Anaphylaxis, sequela        Relevant Medications    EPINEPHrine (EPIPEN) 0 3 mg/0 3 mL SOAJ          Immunizations and preventive care screenings were discussed with patient today  Appropriate education was printed on patient's after visit summary  Counseling:  Dental Health: discussed importance of regular tooth brushing, flossing, and dental visits    Injury prevention: discussed safety/seat belts, safety helmets, smoke detectors, carbon dioxide detectors, and smoking near bedding or upholstery  · Exercise: the importance of regular exercise/physical activity was discussed  Recommend exercise 3-5 times per week for at least 30 minutes  No follow-ups on file  Chief Complaint:     No chief complaint on file  History of Present Illness:     Adult Annual Physical   Patient here for a comprehensive physical exam  The patient reports no problems  Diet and Physical Activity  · Diet/Nutrition: poor diet  · Exercise: no formal exercise and not sedentary  Depression Screening  PHQ-9 Depression Screening    PHQ-9:    Frequency of the following problems over the past two weeks:       Little interest or pleasure in doing things:  1 - several days  Feeling down, depressed, or hopeless:  1 - several days  PHQ-2 Score:  2       General Health  · Sleep: sleeps poorly and sleep is interrupted every 2 hours  · Hearing: normal - bilateral   · Vision: no vision problems, goes for regular eye exams, most recent eye exam <1 year ago and wears glasses  · Dental: regular dental visits and brushes teeth twice daily  /GYN Health  · Last menstrual period: 7/4/20  · Contraceptive method: oral contraceptives  · History of STDs?: no      Review of Systems:     Review of Systems   Constitutional: Negative for activity change, appetite change, chills, fatigue, fever and unexpected weight change  HENT: Negative for congestion and hearing loss  Eyes: Negative for visual disturbance  Respiratory: Negative for cough, chest tightness and shortness of breath  Cardiovascular: Negative for chest pain, palpitations and leg swelling  Gastrointestinal: Negative for abdominal pain, constipation, diarrhea, nausea and vomiting  Genitourinary: Negative for difficulty urinating, dysuria and frequency  Musculoskeletal: Negative for arthralgias and myalgias  Allergic/Immunologic: Positive for environmental allergies  Neurological: Negative for dizziness, weakness, numbness and headaches  Psychiatric/Behavioral: Positive for sleep disturbance  Negative for dysphoric mood  The patient is not nervous/anxious  Past Medical History:     Past Medical History:   Diagnosis Date    Allergic     Anxiety       Past Surgical History:     Past Surgical History:   Procedure Laterality Date    WISDOM TOOTH EXTRACTION      WISDOM TOOTH EXTRACTION        Social History:        Social History     Socioeconomic History    Marital status: Single     Spouse name: None    Number of children: None    Years of education: None    Highest education level: None   Occupational History    None   Social Needs    Financial resource strain: None    Food insecurity:     Worry: None     Inability: None    Transportation needs:     Medical: None     Non-medical: None   Tobacco Use    Smoking status: Never Smoker    Smokeless tobacco: Never Used   Substance and Sexual Activity    Alcohol use: Yes     Comment: occas  , typically drinks on weekend; 5 beers on one day    Drug use: No    Sexual activity: Yes     Partners: Male     Birth control/protection: OCP     Comment: Male partner x 3 years;  [place]et[place]   Lifestyle    Physical activity:     Days per week: None     Minutes per session: None    Stress: None   Relationships    Social connections:     Talks on phone: None     Gets together: None     Attends Voodoo service: None     Active member of club or organization: None     Attends meetings of clubs or organizations: None     Relationship status: None    Intimate partner violence:     Fear of current or ex partner: None     Emotionally abused: None     Physically abused: None     Forced sexual activity: None   Other Topics Concern    None   Social History Narrative    Lives with her boyfriend    Works full time     Exercise: none reported    Diet: well balanced diet during the week    Caffeine: 3 cups coffee per day       Family History:     Family History   Problem Relation Age of Onset    No Known Problems Mother     No Known Problems Father     No Known Problems Sister     No Known Problems Brother     COPD Maternal Grandmother     Asthma Maternal Grandmother     Melanoma Maternal Grandmother     Cancer Maternal Grandfather         Bladder    Melanoma Maternal Grandfather     Diabetes Maternal Grandfather     No Known Problems Paternal Grandmother     No Known Problems Paternal Grandfather     No Known Problems Sister     No Known Problems Sister       Current Medications:     Current Outpatient Medications   Medication Sig Dispense Refill    EPINEPHrine (EPIPEN) 0 3 mg/0 3 mL SOAJ Inject 0 3 mL (0 3 mg total) into a muscle once for 1 dose 2 each 0    norgestimate-ethinyl estradiol (Tri-Sprintec) 0 18/0 215/0 25 MG-35 MCG per tablet Take 1 tablet by mouth daily 84 tablet 0    traZODone (DESYREL) 50 mg tablet Take 1 tablet (50 mg total) by mouth daily at bedtime 5 tablet 0     No current facility-administered medications for this visit  Allergies: Allergies   Allergen Reactions    Acetaminophen Hives     Any blue dye tyenlol, regular tylenol without dye is fine    Apple Itching    Methylphenidate Other (See Comments)    Other      Grass, tree nuts       Physical Exam:     /81 (BP Location: Right arm, Patient Position: Sitting)   Pulse 78   Temp 99 4 °F (37 4 °C)   Resp 16   Ht 5' 3" (1 6 m)   Wt 64 9 kg (143 lb)   SpO2 98%   BMI 25 33 kg/m²     Physical Exam   Constitutional: She is oriented to person, place, and time  Vital signs are normal  She appears well-developed and well-nourished  She is cooperative  HENT:   Right Ear: Hearing, tympanic membrane, external ear and ear canal normal    Left Ear: Hearing, tympanic membrane, external ear and ear canal normal    Nose: Nose normal  No mucosal edema     Mouth/Throat: Uvula is midline, oropharynx is clear and moist and mucous membranes are normal    Eyes: Pupils are equal, round, and reactive to light  Conjunctivae and lids are normal    Neck: Normal range of motion  No JVD present  Carotid bruit is not present  No thyromegaly present  Cardiovascular: Normal rate, regular rhythm, normal heart sounds and intact distal pulses  No murmur heard  Pulmonary/Chest: Effort normal and breath sounds normal  No respiratory distress  Abdominal: Soft  Normal appearance and bowel sounds are normal  There is no tenderness  Musculoskeletal: Normal range of motion  She exhibits no edema  Lymphadenopathy:     She has no cervical adenopathy  Neurological: She is alert and oriented to person, place, and time  She has normal strength and normal reflexes  She displays normal reflexes  Gait normal    Skin: Skin is warm, dry and intact  Psychiatric: She has a normal mood and affect  Her speech is normal and behavior is normal  Judgment and thought content normal  Cognition and memory are normal    Vitals reviewed        Marita Newell, [address] INTERNAL MEDICINE

## 2020-07-17 NOTE — PATIENT INSTRUCTIONS

## 2020-07-17 NOTE — ASSESSMENT & PLAN NOTE
Normal exam of healthy adult female  She is utd with gyn screenings, eye exams, dental ppx  Recommend annual physicals

## 2020-08-12 ENCOUNTER — OFFICE VISIT (OUTPATIENT)
Dept: URGENT CARE | Age: 25
End: 2020-08-12
Payer: COMMERCIAL

## 2020-08-12 VITALS — OXYGEN SATURATION: 99 % | TEMPERATURE: 98 F | RESPIRATION RATE: 18 BRPM | HEART RATE: 115 BPM

## 2020-08-12 DIAGNOSIS — Z20.822 COVID-19 RULED OUT: Primary | ICD-10-CM

## 2020-08-12 PROCEDURE — G0382 LEV 3 HOSP TYPE B ED VISIT: HCPCS | Performed by: NURSE PRACTITIONER

## 2020-08-12 PROCEDURE — U0003 INFECTIOUS AGENT DETECTION BY NUCLEIC ACID (DNA OR RNA); SEVERE ACUTE RESPIRATORY SYNDROME CORONAVIRUS 2 (SARS-COV-2) (CORONAVIRUS DISEASE [COVID-19]), AMPLIFIED PROBE TECHNIQUE, MAKING USE OF HIGH THROUGHPUT TECHNOLOGIES AS DESCRIBED BY CMS-2020-01-R: HCPCS | Performed by: NURSE PRACTITIONER

## 2020-08-14 LAB — SARS-COV-2 RNA SPEC QL NAA+PROBE: NOT DETECTED

## 2020-09-08 NOTE — PROGRESS NOTES
Assessment/Plan:  [facility]  BCM-  TriSprintec        Pap smear q 3yrs  '21  Cervical Cultures till 22   - NA due to national shortage                                                                          RTO 1 yr  SBA monthly                                                                              Exercise 3/ wk                                                                                        Calcium 1,000 mg/d with Vit D                    Depression Screen: Neg                                     Diagnoses and all orders for this visit:    Encntr for gyn exam (general) (routine) w/o abn findings    Encounter for prescription of oral contraceptives  -     norgestimate-ethinyl estradiol (Tri-Sprintec) 0 18/0 215/0 25 MG-35 MCG per tablet; Take 1 tablet by mouth daily              Subjective:        Patient ID: Khris Holly is a 31 y o  female  Ms Latrice Linda is here for her annual visit  She offers no gynecologic complaints  She has suffering from insomnia due to recent life stresses- end of a long-term relationship, new job, moving, and COVID  She had intercourse once with another person but that was considered a 1 and done  She has no symptoms for cervicitis or PID  The following portions of the patient's history were reviewed and updated as appropriate: She  has a past medical history of Allergic and Anxiety    Patient Active Problem List    Diagnosis Date Noted    Insomnia 07/17/2020    Overweight with body mass index (BMI) of 25 to 25 9 in adult 07/17/2020    Exposure to COVID-19 virus 05/11/2020    Strep pharyngitis 02/14/2020    Annual physical exam 07/12/2019    Encounter for prescription of oral contraceptives 05/14/2018    Encntr for gyn exam (general) (routine) w/o abn findings 05/14/2018    Routine screening for STI (sexually transmitted infection) 05/14/2018   PMH:  Menarche 14  Coitarche 24, lifetime partners 6  She  has a past surgical history that includes [place] tooth extraction and [place] tooth extraction  Her family history includes Asthma in her maternal grandmother; COPD in her maternal grandmother; Cancer in her maternal grandfather; Diabetes in her maternal grandfather; Melanoma in her maternal grandfather and maternal grandmother; No Known Problems in her brother, father, mother, paternal grandfather, paternal grandmother, sister, sister, and sister  FH:  MGM- Breast Ca dx 69  She  reports that she has never smoked  She has never used smokeless tobacco  She reports current alcohol use of about 3 0 standard drinks of alcohol per week  She reports that she does not use drugs  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, now  for [employer] [employer]  Current Outpatient Medications   Medication Sig Dispense Refill    norgestimate-ethinyl estradiol (Tri-Sprintec) 0 18/0 215/0 25 MG-35 MCG per tablet Take 1 tablet by mouth daily 84 tablet 3    traZODone (DESYREL) 50 mg tablet Take 1 tablet (50 mg total) by mouth daily at bedtime 5 tablet 0    EPINEPHrine (EPIPEN) 0 3 mg/0 3 mL SOAJ Inject 0 3 mL (0 3 mg total) into a muscle once for 1 dose 2 each 0     No current facility-administered medications for this visit  Current Outpatient Medications on File Prior to Visit   Medication Sig    traZODone (DESYREL) 50 mg tablet Take 1 tablet (50 mg total) by mouth daily at bedtime    [DISCONTINUED] norgestimate-ethinyl estradiol (Tri-Sprintec) 0 18/0 215/0 25 MG-35 MCG per tablet Take 1 tablet by mouth daily    EPINEPHrine (EPIPEN) 0 3 mg/0 3 mL SOAJ Inject 0 3 mL (0 3 mg total) into a muscle once for 1 dose     No current facility-administered medications on file prior to visit  She is allergic to acetaminophen; apple; methylphenidate; and other       Review of Systems   Constitutional: Negative for activity change, appetite change, chills, fever and unexpected weight change  HENT: Negative for congestion and rhinorrhea  Eyes: Visual disturbance: wears glasses  Respiratory: Negative for cough and shortness of breath  Cardiovascular: Negative for chest pain and leg swelling  Gastrointestinal: Negative for abdominal pain  Endocrine: Negative for polyuria  Genitourinary: Negative for difficulty urinating, dyspareunia, dysuria, hematuria, menstrual problem, pelvic pain, urgency, vaginal bleeding, vaginal discharge and vaginal pain  Musculoskeletal:        Denies breast mass, skin changes, dimpling, reddening, nipple retraction, breast discharge   Skin: Negative for rash  Allergic/Immunologic: Food allergies: patient suspects she has a lactose intolerance, apple  Psychiatric/Behavioral: Positive for sleep disturbance  The patient is nervous/anxious  Objective:    Vitals:    09/09/20 1030   BP: 124/72   BP Location: Left arm   Patient Position: Sitting   Cuff Size: Standard   Temp: 97 8 °F (36 6 °C)   Weight: 65 kg (143 lb 3 2 oz)   Height: 5' 3" (1 6 m)            Physical Exam  Vitals signs and nursing note reviewed  Constitutional:       Appearance: She is well-developed  HENT:      Head: Normocephalic and atraumatic  Eyes:      Conjunctiva/sclera: Conjunctivae normal       Pupils: Pupils are equal, round, and reactive to light  Neck:      Musculoskeletal: Normal range of motion and neck supple  Thyroid: No thyromegaly  Trachea: No tracheal deviation  Cardiovascular:      Rate and Rhythm: Normal rate and regular rhythm  Heart sounds: Normal heart sounds  No murmur  Pulmonary:      Effort: Pulmonary effort is normal  No respiratory distress  Breath sounds: Normal breath sounds  No wheezing  Chest:      Breasts: Breasts are symmetrical          Right: No inverted nipple, mass, nipple discharge, skin change or tenderness  Left: No inverted nipple, mass, nipple discharge, skin change or tenderness     Abdominal:      General: Bowel sounds are normal  There is no distension  Palpations: Abdomen is soft  There is no mass  Tenderness: There is no abdominal tenderness  Genitourinary:     General: Normal vulva  Labia:         Right: No rash, tenderness or lesion  Left: No rash, tenderness or lesion  Vagina: Normal       Cervix: No cervical motion tenderness or discharge  Uterus: Not deviated, not enlarged and not tender  Adnexa:         Right: No mass, tenderness or fullness  Left: No mass, tenderness or fullness  Rectum: No external hemorrhoid  Comments: Urethral meatus within normal limits  Perineum within normal limits  Bladder well supported  The pelvic exam is anxiety provoking  It was difficult for her to relax  Musculoskeletal: Normal range of motion  Skin:     General: Skin is warm and dry  Neurological:      Mental Status: She is alert and oriented to person, place, and time  Psychiatric:         Behavior: Behavior normal          Thought Content:  Thought content normal          Judgment: Judgment normal

## 2020-09-09 ENCOUNTER — ANNUAL EXAM (OUTPATIENT)
Dept: GYNECOLOGY | Facility: CLINIC | Age: 25
End: 2020-09-09
Payer: COMMERCIAL

## 2020-09-09 VITALS
WEIGHT: 143.2 LBS | TEMPERATURE: 97.8 F | SYSTOLIC BLOOD PRESSURE: 124 MMHG | DIASTOLIC BLOOD PRESSURE: 72 MMHG | BODY MASS INDEX: 25.37 KG/M2 | HEIGHT: 63 IN

## 2020-09-09 DIAGNOSIS — Z30.011 ENCOUNTER FOR PRESCRIPTION OF ORAL CONTRACEPTIVES: ICD-10-CM

## 2020-09-09 DIAGNOSIS — Z01.419 ENCNTR FOR GYN EXAM (GENERAL) (ROUTINE) W/O ABN FINDINGS: Primary | ICD-10-CM

## 2020-09-09 PROCEDURE — 99395 PREV VISIT EST AGE 18-39: CPT | Performed by: OBSTETRICS & GYNECOLOGY

## 2020-09-09 RX ORDER — NORGESTIMATE AND ETHINYL ESTRADIOL 7DAYSX3 28
1 KIT ORAL DAILY
Qty: 84 TABLET | Refills: 3 | Status: SHIPPED | OUTPATIENT
Start: 2020-09-09 | End: 2021-05-04 | Stop reason: SDUPTHER

## 2021-01-20 DIAGNOSIS — Z23 ENCOUNTER FOR IMMUNIZATION: ICD-10-CM

## 2021-01-25 ENCOUNTER — IMMUNIZATIONS (OUTPATIENT)
Dept: FAMILY MEDICINE CLINIC | Facility: HOSPITAL | Age: 26
End: 2021-01-25

## 2021-01-25 DIAGNOSIS — Z23 ENCOUNTER FOR IMMUNIZATION: Primary | ICD-10-CM

## 2021-01-25 PROCEDURE — 0001A SARS-COV-2 / COVID-19 MRNA VACCINE (PFIZER-BIONTECH) 30 MCG: CPT

## 2021-01-25 PROCEDURE — 91300 SARS-COV-2 / COVID-19 MRNA VACCINE (PFIZER-BIONTECH) 30 MCG: CPT

## 2021-02-15 ENCOUNTER — IMMUNIZATIONS (OUTPATIENT)
Dept: FAMILY MEDICINE CLINIC | Facility: HOSPITAL | Age: 26
End: 2021-02-15

## 2021-02-15 DIAGNOSIS — Z23 ENCOUNTER FOR IMMUNIZATION: Primary | ICD-10-CM

## 2021-02-15 PROCEDURE — 91300 SARS-COV-2 / COVID-19 MRNA VACCINE (PFIZER-BIONTECH) 30 MCG: CPT

## 2021-02-15 PROCEDURE — 0002A SARS-COV-2 / COVID-19 MRNA VACCINE (PFIZER-BIONTECH) 30 MCG: CPT

## 2021-02-17 ENCOUNTER — TELEPHONE (OUTPATIENT)
Dept: INTERNAL MEDICINE CLINIC | Facility: CLINIC | Age: 26
End: 2021-02-17

## 2021-02-17 ENCOUNTER — TELEMEDICINE (OUTPATIENT)
Dept: INTERNAL MEDICINE CLINIC | Facility: CLINIC | Age: 26
End: 2021-02-17
Payer: COMMERCIAL

## 2021-02-17 DIAGNOSIS — B00.59 HSV (HERPES SIMPLEX VIRUS) INFECTION OF EYELID: Primary | ICD-10-CM

## 2021-02-17 PROCEDURE — 99213 OFFICE O/P EST LOW 20 MIN: CPT | Performed by: NURSE PRACTITIONER

## 2021-02-17 RX ORDER — ACYCLOVIR 200 MG/5ML
400 SUSPENSION ORAL 3 TIMES DAILY
Qty: 300 ML | Refills: 0 | Status: SHIPPED | OUTPATIENT
Start: 2021-02-17 | End: 2021-11-10 | Stop reason: SDUPTHER

## 2021-02-17 RX ORDER — ACYCLOVIR 400 MG/1
200 TABLET ORAL 3 TIMES DAILY
Status: CANCELLED | OUTPATIENT
Start: 2021-02-17

## 2021-02-17 NOTE — ASSESSMENT & PLAN NOTE
Hx of hsv on R eye with recurrences since childhood  Onset of redness in the corner of R eye with soreness around the lid  She has typically responded well to treatment with acyclovir and will prescribe a 10 day course  We discussed suppressive therapy given pt report of flare ups about 3-4 times per year  At this point she wants to think it through and will let me know if she decides to go that route

## 2021-02-17 NOTE — PROGRESS NOTES
Virtual Regular Visit      Assessment/Plan:    Problem List Items Addressed This Visit        Musculoskeletal and Integument    HSV (herpes simplex virus) infection of eyelid - Primary     Hx of hsv on R eye with recurrences since childhood  Onset of redness in the corner of R eye with soreness around the lid  She has typically responded well to treatment with acyclovir and will prescribe a 10 day course  We discussed suppressive therapy given pt report of flare ups about 3-4 times per year  At this point she wants to think it through and will let me know if she decides to go that route  Relevant Medications    acyclovir (ZOVIRAX) 200 mg/5 mL oral suspension               Reason for visit is   Chief Complaint   Patient presents with    Virtual Regular Visit        Encounter provider Kevin Gary, [facility]    Provider located at [address] [id number]      Recent Visits  No visits were found meeting these conditions  Showing recent visits within past 7 days and meeting all other requirements     Today's Visits  Date Type Provider Dept   02/17/21 [facility], [address] Internal Med   02/17/21 Telephone [facility]  Internal Med   Showing today's visits and meeting all other requirements     Future Appointments  No visits were found meeting these conditions  Showing future appointments within next 150 days and meeting all other requirements        The patient was identified by name and date of birth  Tonya Frazier was informed that this is a telemedicine visit and that the visit is being conducted through [facility] and patient was informed that this is a secure, HIPAA-compliant platform  She agrees to proceed     My office door was closed  No one else was in the room  She acknowledged consent and understanding of privacy and security of the video platform   The patient has agreed to participate and understands they can discontinue the visit at any time  Patient is aware this is a billable service  Subjective  Jose Daniel Welch is a 31 y o  female calls for evaluation of hsv infection   Pt is a 30 y/o female who calls today for evaluation of R eye irritation  PMH of hsv infection to the R eye with recurrences since childhood  She is triggered by stress or illness, she notes that on 2/15 she had her 2nd dose of covid vaccine  She did feel feverish and unwell following rash so believes this triggered the flair up  She denies blurred vision, the eye feels sore however  Typical course in the past has been that if untreated the eye becomes swollen with purulent drainage but has always cleared up with acyclovir therapy  Also noted that on 2/10 she sent a message to my chart regarding a sore on her nose with pictures included  The message was not passed to me but I reviewed these images today; appeared to be honey-colored crusted lesion on the left lateral tip of her nose on an erythematous base  This has since begun healing  Past Medical History:   Diagnosis Date    Allergic     Anxiety        Past Surgical History:   Procedure Laterality Date    WISDOM TOOTH EXTRACTION      WISDOM TOOTH EXTRACTION         Current Outpatient Medications   Medication Sig Dispense Refill    acyclovir (ZOVIRAX) 200 mg/5 mL oral suspension Take 10 mL (400 mg total) by mouth 3 (three) times a day for 10 days 300 mL 0    EPINEPHrine (EPIPEN) 0 3 mg/0 3 mL SOAJ Inject 0 3 mL (0 3 mg total) into a muscle once for 1 dose 2 each 0    norgestimate-ethinyl estradiol (Tri-Sprintec) 0 18/0 215/0 25 MG-35 MCG per tablet Take 1 tablet by mouth daily 84 tablet 3    traZODone (DESYREL) 50 mg tablet Take 1 tablet (50 mg total) by mouth daily at bedtime 5 tablet 0     No current facility-administered medications for this visit           Allergies   Allergen Reactions    Acetaminophen Hives     Any blue dye tyenlol, regular tylenol without dye is fine    Apple Itching    Methylphenidate Other (See Comments)    Other      Grass, tree nuts        Review of Systems   Constitutional: Negative for appetite change, chills, fatigue and fever  Eyes: Positive for pain  Negative for photophobia, discharge, redness, itching and visual disturbance  Skin: Positive for rash (tip of nose, inner corner of R eye redness)  Video Exam    There were no vitals filed for this visit  Physical Exam  Constitutional:       General: She is awake  She is not in acute distress  Appearance: Normal appearance  She is well-developed  She is not ill-appearing or diaphoretic  Eyes:      General:         Right eye: No discharge  Left eye: No discharge  Conjunctiva/sclera: Conjunctivae normal       Right eye: Right conjunctiva is not injected  Left eye: Left conjunctiva is not injected  Comments: Redness inner corner of R eye, no visible vesicles or skin lesions  Conjunctiva white  No visible drainage   Pulmonary:      Effort: Pulmonary effort is normal    Skin:     General: Skin is dry  Findings: Rash (healing on tip of nose, no swelling or drainage) present  Neurological:      Mental Status: She is alert and oriented to person, place, and time  Psychiatric:         Attention and Perception: Attention normal          Mood and Affect: Mood normal          Speech: Speech normal          Behavior: Behavior normal  Behavior is cooperative  Thought Content: Thought content normal          Cognition and Memory: Cognition normal          Judgment: Judgment normal           I spent 15 minutes with patient today in which greater than 50% of the time was spent in counseling/coordination of care regarding history, symptoms, management, fu      VIRTUAL VISIT DISCLAIMER    Esau Burnham acknowledges that she has consented to an online visit or consultation   She understands that the online visit is based solely on information provided by her, and that, in the absence of a face-to-face physical evaluation by the physician, the diagnosis she receives is both limited and provisional in terms of accuracy and completeness  This is not intended to replace a full medical face-to-face evaluation by the physician  Kaia Echavarria understands and accepts these terms

## 2021-05-04 DIAGNOSIS — Z30.011 ENCOUNTER FOR PRESCRIPTION OF ORAL CONTRACEPTIVES: ICD-10-CM

## 2021-05-04 RX ORDER — NORGESTIMATE AND ETHINYL ESTRADIOL 7DAYSX3 28
1 KIT ORAL DAILY
Qty: 84 TABLET | Refills: 0 | Status: SHIPPED | OUTPATIENT
Start: 2021-05-04 | End: 2021-07-16 | Stop reason: SDUPTHER

## 2021-06-24 ENCOUNTER — APPOINTMENT (OUTPATIENT)
Dept: LAB | Facility: HOSPITAL | Age: 26
End: 2021-06-24

## 2021-06-24 DIAGNOSIS — Z00.8 HEALTH EXAMINATION IN POPULATION SURVEY: ICD-10-CM

## 2021-06-24 LAB
CHOLEST SERPL-MCNC: 141 MG/DL (ref 50–200)
EST. AVERAGE GLUCOSE BLD GHB EST-MCNC: 91 MG/DL
HBA1C MFR BLD: 4.8 %
HDLC SERPL-MCNC: 85 MG/DL
LDLC SERPL CALC-MCNC: 40 MG/DL (ref 0–100)
NONHDLC SERPL-MCNC: 56 MG/DL
TRIGL SERPL-MCNC: 78 MG/DL

## 2021-06-24 PROCEDURE — 36415 COLL VENOUS BLD VENIPUNCTURE: CPT

## 2021-06-24 PROCEDURE — 83036 HEMOGLOBIN GLYCOSYLATED A1C: CPT

## 2021-06-24 PROCEDURE — 80061 LIPID PANEL: CPT

## 2021-07-16 DIAGNOSIS — Z30.011 ENCOUNTER FOR PRESCRIPTION OF ORAL CONTRACEPTIVES: ICD-10-CM

## 2021-07-16 RX ORDER — NORGESTIMATE AND ETHINYL ESTRADIOL 7DAYSX3 28
1 KIT ORAL DAILY
Qty: 84 TABLET | Refills: 0 | Status: SHIPPED | OUTPATIENT
Start: 2021-07-16 | End: 2021-08-30 | Stop reason: SDUPTHER

## 2021-07-19 ENCOUNTER — OFFICE VISIT (OUTPATIENT)
Dept: INTERNAL MEDICINE CLINIC | Facility: CLINIC | Age: 26
End: 2021-07-19
Payer: COMMERCIAL

## 2021-07-19 VITALS
BODY MASS INDEX: 26.36 KG/M2 | TEMPERATURE: 98.4 F | HEART RATE: 65 BPM | RESPIRATION RATE: 14 BRPM | DIASTOLIC BLOOD PRESSURE: 70 MMHG | SYSTOLIC BLOOD PRESSURE: 118 MMHG | OXYGEN SATURATION: 96 % | HEIGHT: 63 IN | WEIGHT: 148.8 LBS

## 2021-07-19 DIAGNOSIS — E66.3 OVERWEIGHT WITH BODY MASS INDEX (BMI) OF 25 TO 25.9 IN ADULT: ICD-10-CM

## 2021-07-19 DIAGNOSIS — Z00.00 ANNUAL PHYSICAL EXAM: Primary | ICD-10-CM

## 2021-07-19 DIAGNOSIS — G47.00 INSOMNIA, UNSPECIFIED TYPE: ICD-10-CM

## 2021-07-19 PROCEDURE — 99395 PREV VISIT EST AGE 18-39: CPT | Performed by: NURSE PRACTITIONER

## 2021-07-19 NOTE — PATIENT INSTRUCTIONS

## 2021-07-19 NOTE — PROGRESS NOTES
[facility] INTERNAL MEDICINE    NAME: Chitra Becerra  AGE: 31 y o  SEX: female  : 1995     DATE: 2021     Assessment and Plan:     Problem List Items Addressed This Visit        Other    Annual physical exam - Primary     Normal exam of healthy adult female  UTD with immunizations and gyn exams  Reviewed lipid panel and a1c today, both of which were normal   Gets regular eye and dental exams  Insomnia     Still with poor sleep, previously prescribed trazodone but pt never took it because she does not like taking medication  Suggested she try melatonin  Overweight with body mass index (BMI) of 25 to 25 9 in adult     Reinforced healthy diet, encouraged regular exercise  Schedule makes exercise difficult  Immunizations and preventive care screenings were discussed with patient today  Appropriate education was printed on patient's after visit summary  Counseling:  Alcohol/drug use: discussed moderation in alcohol intake, the recommendations for healthy alcohol use, and avoidance of illicit drug use  Dental Health: discussed importance of regular tooth brushing, flossing, and dental visits  Injury prevention: discussed safety/seat belts, safety helmets, smoke detectors, carbon dioxide detectors, and smoking near bedding or upholstery  · Exercise: the importance of regular exercise/physical activity was discussed  Recommend exercise 3-5 times per week for at least 30 minutes  BMI Counseling: Body mass index is 26 36 kg/m²  The BMI is above normal  Nutrition recommendations include decreasing portion sizes, encouraging healthy choices of fruits and vegetables, consuming healthier snacks, moderation in carbohydrate intake, increasing intake of lean protein, reducing intake of saturated and trans fat and reducing intake of cholesterol   Exercise recommendations include moderate physical activity 150 minutes/week, exercising 3-5 times per week and strength training exercises  Return in about 1 year (around 7/19/2022) for Annual physical      Chief Complaint:     No chief complaint on file  History of Present Illness:     Adult Annual Physical   Patient here for a comprehensive physical exam  The patient reports no problems  Diet and Physical Activity  · Diet/Nutrition: well balanced diet, consuming 3-5 servings of fruits/vegetables daily and adequate whole grain intake  · Exercise: no formal exercise  Depression Screening  PHQ-9 Depression Screening    PHQ-9:   Frequency of the following problems over the past two weeks:      Little interest or pleasure in doing things: 0 - not at all  Feeling down, depressed, or hopeless: 0 - not at all  PHQ-2 Score: 0       General Health  · Sleep: sleeps poorly and work schedule varies throughout the week   · Hearing: normal - bilateral   · Vision: no vision problems, goes for regular eye exams, most recent eye exam <1 year ago and wears glasses  · Dental: regular dental visits and brushes teeth twice daily  /GYN Health  · Last menstrual period: 6/26ish  · Contraceptive method: oral contraceptives  · History of STDs?: no      Review of Systems:     Review of Systems   Constitutional: Negative for activity change, appetite change, chills, diaphoresis, fatigue, fever and unexpected weight change  HENT: Negative for hearing loss  Eyes: Negative for visual disturbance  Respiratory: Negative for cough, chest tightness and shortness of breath  Cardiovascular: Negative for chest pain, palpitations and leg swelling  Gastrointestinal: Negative for abdominal pain, constipation, diarrhea, nausea and vomiting  Genitourinary: Negative for difficulty urinating, dysuria, frequency and menstrual problem  Musculoskeletal: Negative for arthralgias, back pain, myalgias and neck pain     Allergic/Immunologic: Negative for environmental allergies  Neurological: Negative for dizziness, weakness, numbness and headaches  Psychiatric/Behavioral: Negative for dysphoric mood and sleep disturbance  The patient is not nervous/anxious  Past Medical History:     Past Medical History:   Diagnosis Date    Allergic     Anxiety       Past Surgical History:     Past Surgical History:   Procedure Laterality Date    WISDOM TOOTH EXTRACTION      WISDOM TOOTH EXTRACTION        Social History:     Social History     Socioeconomic History    Marital status: Single     Spouse name: None    Number of children: None    Years of education: None    Highest education level: None   Occupational History    None   Tobacco Use    Smoking status: Never Smoker    Smokeless tobacco: Never Used   Vaping Use    Vaping Use: Never used   Substance and Sexual Activity    Alcohol use: Yes     Alcohol/week: 3 0 standard drinks     Types: 3 Cans of beer per week    Drug use: No    Sexual activity: Yes     Partners: Male     Birth control/protection: OCP   Other Topics Concern    None   Social History Narrative    Lives with her boyfriend    Works full time     Exercise: none reported    Diet: well balanced diet during the week    Caffeine: 3 cups coffee per day      Social Determinants of Health     Financial Resource Strain:     Difficulty of Paying Living Expenses:    Food Insecurity:     Worried About Running Out of Food in the Last Year:     [address] in the Last Year:    Transportation Needs:     Lack of Transportation (Medical):      Lack of Transportation (Non-Medical):    Physical Activity:     Days of Exercise per Week:     Minutes of Exercise per Session:    Stress:     Feeling of Stress :    Social Connections:     Frequency of Communication with Friends and Family:     Frequency of Social Gatherings with Friends and Family:     Attends Mandaen Services:     Active Member of Clubs or Organizations:     Attends Club or Organization Meetings:     Marital Status:    Intimate Partner Violence:     Fear of Current or Ex-Partner:     Emotionally Abused:     Physically Abused:     Sexually Abused:       Family History:     Family History   Problem Relation Age of Onset    No Known Problems Mother     No Known Problems Father     No Known Problems Sister     No Known Problems Brother     COPD Maternal Grandmother     Asthma Maternal Grandmother     Melanoma Maternal Grandmother     Cancer Maternal Grandfather         Bladder    Melanoma Maternal Grandfather     Diabetes Maternal Grandfather     No Known Problems Paternal Grandmother     No Known Problems Paternal Grandfather     No Known Problems Sister     No Known Problems Sister       Current Medications:     Current Outpatient Medications   Medication Sig Dispense Refill    norgestimate-ethinyl estradiol (Tri-Sprintec) 0 18/0 215/0 25 MG-35 MCG per tablet Take 1 tablet by mouth daily 84 tablet 0    acyclovir (ZOVIRAX) 200 mg/5 mL oral suspension Take 10 mL (400 mg total) by mouth 3 (three) times a day for 10 days 300 mL 0    EPINEPHrine (EPIPEN) 0 3 mg/0 3 mL SOAJ Inject 0 3 mL (0 3 mg total) into a muscle once for 1 dose 2 each 0     No current facility-administered medications for this visit  Allergies: Allergies   Allergen Reactions    Acetaminophen Hives     Any blue dye tyenlol, regular tylenol without dye is fine    Apple - Food Allergy Itching    Methylphenidate Other (See Comments)    Other      Grass, tree nuts       Physical Exam:     /70   Pulse 65   Temp 98 4 °F (36 9 °C)   Resp 14   Ht 5' 3" (1 6 m)   Wt 67 5 kg (148 lb 12 8 oz)   SpO2 96%   BMI 26 36 kg/m²     Physical Exam  Vitals reviewed  Constitutional:       General: She is awake  She is not in acute distress  Appearance: Normal appearance  She is well-developed and well-groomed  She is not ill-appearing or toxic-appearing  HENT:      Head: Normocephalic  Right Ear: Hearing, tympanic membrane, ear canal and external ear normal       Left Ear: Hearing, tympanic membrane, ear canal and external ear normal       Nose: Nose normal       Mouth/Throat:      Lips: Pink  Mouth: Mucous membranes are moist       Pharynx: Uvula midline  Eyes:      General: Lids are normal       Conjunctiva/sclera: Conjunctivae normal       Pupils: Pupils are equal, round, and reactive to light  Neck:      Thyroid: No thyroid mass or thyromegaly  Vascular: Normal carotid pulses  No carotid bruit or JVD  Cardiovascular:      Rate and Rhythm: Normal rate and regular rhythm  Pulses: Normal pulses  Heart sounds: Normal heart sounds, S1 normal and S2 normal    Pulmonary:      Effort: Pulmonary effort is normal       Breath sounds: Normal breath sounds  Abdominal:      General: Abdomen is flat  Bowel sounds are normal       Palpations: Abdomen is soft  Tenderness: There is no abdominal tenderness  Musculoskeletal:         General: Normal range of motion  Cervical back: Full passive range of motion without pain  Right lower leg: No edema  Left lower leg: No edema  Lymphadenopathy:      Head:      Right side of head: No submental, submandibular, tonsillar, preauricular, posterior auricular or occipital adenopathy  Left side of head: No submental, submandibular, tonsillar, preauricular, posterior auricular or occipital adenopathy  Cervical: No cervical adenopathy  Skin:     General: Skin is warm and dry  Neurological:      General: No focal deficit present  Mental Status: She is alert and oriented to person, place, and time  Sensory: No sensory deficit  Motor: Motor function is intact  Deep Tendon Reflexes: Reflexes are normal and symmetric     Psychiatric:         Attention and Perception: Attention normal          Mood and Affect: Mood normal          Speech: Speech normal          Behavior: Behavior normal  Behavior is cooperative  Thought Content:  Thought content normal          Cognition and Memory: Cognition normal          Judgment: Judgment normal           JOJO Tran   [facility]

## 2021-07-19 NOTE — ASSESSMENT & PLAN NOTE
Normal exam of healthy adult female  UTD with immunizations and gyn exams  Reviewed lipid panel and a1c today, both of which were normal   Gets regular eye and dental exams

## 2021-07-19 NOTE — ASSESSMENT & PLAN NOTE
Still with poor sleep, previously prescribed trazodone but pt never took it because she does not like taking medication  Suggested she try melatonin

## 2021-08-30 ENCOUNTER — TELEPHONE (OUTPATIENT)
Dept: OBGYN CLINIC | Facility: CLINIC | Age: 26
End: 2021-08-30

## 2021-08-30 DIAGNOSIS — Z30.011 ENCOUNTER FOR PRESCRIPTION OF ORAL CONTRACEPTIVES: ICD-10-CM

## 2021-08-30 RX ORDER — NORGESTIMATE AND ETHINYL ESTRADIOL 7DAYSX3 28
1 KIT ORAL DAILY
Qty: 84 TABLET | Refills: 0 | Status: SHIPPED | OUTPATIENT
Start: 2021-08-30 | End: 2021-10-06 | Stop reason: SDUPTHER

## 2021-10-04 ENCOUNTER — ANNUAL EXAM (OUTPATIENT)
Dept: OBGYN CLINIC | Facility: CLINIC | Age: 26
End: 2021-10-04
Payer: COMMERCIAL

## 2021-10-04 VITALS
BODY MASS INDEX: 25.4 KG/M2 | DIASTOLIC BLOOD PRESSURE: 80 MMHG | HEIGHT: 64 IN | SYSTOLIC BLOOD PRESSURE: 112 MMHG | WEIGHT: 148.8 LBS

## 2021-10-04 DIAGNOSIS — Z12.4 ENCOUNTER FOR PAPANICOLAOU SMEAR FOR CERVICAL CANCER SCREENING: ICD-10-CM

## 2021-10-04 DIAGNOSIS — B37.3 VAGINAL YEAST INFECTION: ICD-10-CM

## 2021-10-04 DIAGNOSIS — Z01.411 ENCNTR FOR GYN EXAM (GENERAL) (ROUTINE) W ABNORMAL FINDINGS: Primary | ICD-10-CM

## 2021-10-04 DIAGNOSIS — Z11.3 SCREENING FOR STD (SEXUALLY TRANSMITTED DISEASE): ICD-10-CM

## 2021-10-04 DIAGNOSIS — N76.0 BV (BACTERIAL VAGINOSIS): ICD-10-CM

## 2021-10-04 DIAGNOSIS — B96.89 BV (BACTERIAL VAGINOSIS): ICD-10-CM

## 2021-10-04 LAB
BV WHIFF TEST VAG QL: ABNORMAL
CLUE CELLS SPEC QL WET PREP: ABNORMAL
PH SMN: ABNORMAL [PH]
SL AMB POCT WET MOUNT: ABNORMAL
T VAGINALIS VAG QL WET PREP: ABNORMAL
YEAST VAG QL WET PREP: ABNORMAL

## 2021-10-04 PROCEDURE — 87210 SMEAR WET MOUNT SALINE/INK: CPT | Performed by: NURSE PRACTITIONER

## 2021-10-04 PROCEDURE — 87491 CHLMYD TRACH DNA AMP PROBE: CPT | Performed by: NURSE PRACTITIONER

## 2021-10-04 PROCEDURE — 99395 PREV VISIT EST AGE 18-39: CPT | Performed by: NURSE PRACTITIONER

## 2021-10-04 PROCEDURE — 87591 N.GONORRHOEAE DNA AMP PROB: CPT | Performed by: NURSE PRACTITIONER

## 2021-10-04 PROCEDURE — G0145 SCR C/V CYTO,THINLAYER,RESCR: HCPCS | Performed by: NURSE PRACTITIONER

## 2021-10-04 RX ORDER — METRONIDAZOLE 7.5 MG/G
1 GEL VAGINAL DAILY
Qty: 5 G | Refills: 0 | Status: SHIPPED | OUTPATIENT
Start: 2021-10-04 | End: 2021-10-09

## 2021-10-04 RX ORDER — CLOTRIMAZOLE 1 %
1 CREAM WITH APPLICATOR VAGINAL
Qty: 45 G | Refills: 0 | Status: SHIPPED | OUTPATIENT
Start: 2021-10-04 | End: 2021-10-11

## 2021-10-06 DIAGNOSIS — Z30.011 ENCOUNTER FOR PRESCRIPTION OF ORAL CONTRACEPTIVES: ICD-10-CM

## 2021-10-06 LAB
C TRACH DNA SPEC QL NAA+PROBE: NEGATIVE
N GONORRHOEA DNA SPEC QL NAA+PROBE: NEGATIVE

## 2021-10-07 ENCOUNTER — TELEPHONE (OUTPATIENT)
Dept: OBGYN CLINIC | Facility: CLINIC | Age: 26
End: 2021-10-07

## 2021-10-07 DIAGNOSIS — Z30.011 ENCOUNTER FOR PRESCRIPTION OF ORAL CONTRACEPTIVES: ICD-10-CM

## 2021-10-07 LAB
LAB AP GYN PRIMARY INTERPRETATION: NORMAL
Lab: NORMAL
PATH INTERP SPEC-IMP: NORMAL

## 2021-10-07 RX ORDER — NORGESTIMATE AND ETHINYL ESTRADIOL 7DAYSX3 28
1 KIT ORAL DAILY
Qty: 84 TABLET | Refills: 3 | Status: SHIPPED | OUTPATIENT
Start: 2021-10-07

## 2021-10-07 RX ORDER — NORGESTIMATE AND ETHINYL ESTRADIOL 7DAYSX3 28
1 KIT ORAL DAILY
Qty: 84 TABLET | Refills: 3 | Status: SHIPPED | OUTPATIENT
Start: 2021-10-07 | End: 2021-10-07

## 2021-11-10 ENCOUNTER — TELEPHONE (OUTPATIENT)
Dept: OBGYN CLINIC | Facility: CLINIC | Age: 26
End: 2021-11-10

## 2021-11-10 DIAGNOSIS — B00.59 HSV (HERPES SIMPLEX VIRUS) INFECTION OF EYELID: ICD-10-CM

## 2021-11-10 RX ORDER — ACYCLOVIR 200 MG/5ML
400 SUSPENSION ORAL 3 TIMES DAILY
Qty: 300 ML | Refills: 0 | Status: SHIPPED | OUTPATIENT
Start: 2021-11-10 | End: 2022-04-21 | Stop reason: SDUPTHER

## 2021-12-11 ENCOUNTER — IMMUNIZATIONS (OUTPATIENT)
Dept: FAMILY MEDICINE CLINIC | Facility: HOSPITAL | Age: 26
End: 2021-12-11

## 2021-12-11 DIAGNOSIS — Z23 ENCOUNTER FOR IMMUNIZATION: Primary | ICD-10-CM

## 2021-12-11 PROCEDURE — 0001A COVID-19 PFIZER VACC 0.3 ML: CPT

## 2021-12-11 PROCEDURE — 91300 COVID-19 PFIZER VACC 0.3 ML: CPT

## 2021-12-21 PROBLEM — N76.0 VAGINAL INFECTION: Status: ACTIVE | Noted: 2021-12-21

## 2021-12-22 ENCOUNTER — OFFICE VISIT (OUTPATIENT)
Dept: OBGYN CLINIC | Facility: CLINIC | Age: 26
End: 2021-12-22
Payer: COMMERCIAL

## 2021-12-22 VITALS
DIASTOLIC BLOOD PRESSURE: 78 MMHG | BODY MASS INDEX: 27.64 KG/M2 | HEIGHT: 63 IN | SYSTOLIC BLOOD PRESSURE: 122 MMHG | WEIGHT: 156 LBS

## 2021-12-22 DIAGNOSIS — N76.0 BACTERIAL VAGINOSIS: ICD-10-CM

## 2021-12-22 DIAGNOSIS — N76.0 VAGINAL INFECTION: Primary | ICD-10-CM

## 2021-12-22 DIAGNOSIS — N89.8 VAGINAL ODOR: ICD-10-CM

## 2021-12-22 DIAGNOSIS — B96.89 BACTERIAL VAGINOSIS: ICD-10-CM

## 2021-12-22 LAB
BV WHIFF TEST VAG QL: POSITIVE
CLUE CELLS SPEC QL WET PREP: POSITIVE
PH SMN: 5.5 [PH]
SL AMB POCT WET MOUNT: ABNORMAL
T VAGINALIS VAG QL WET PREP: NEGATIVE
YEAST VAG QL WET PREP: NEGATIVE

## 2021-12-22 PROCEDURE — 87210 SMEAR WET MOUNT SALINE/INK: CPT | Performed by: OBSTETRICS & GYNECOLOGY

## 2021-12-22 PROCEDURE — 99213 OFFICE O/P EST LOW 20 MIN: CPT | Performed by: OBSTETRICS & GYNECOLOGY

## 2021-12-22 RX ORDER — TINIDAZOLE 500 MG/1
TABLET ORAL
Qty: 10 TABLET | Refills: 0 | Status: SHIPPED | OUTPATIENT
Start: 2021-12-22 | End: 2021-12-26

## 2022-04-21 ENCOUNTER — TELEPHONE (OUTPATIENT)
Dept: FAMILY MEDICINE CLINIC | Facility: CLINIC | Age: 27
End: 2022-04-21

## 2022-04-21 DIAGNOSIS — B00.59 HSV (HERPES SIMPLEX VIRUS) INFECTION OF EYELID: Primary | ICD-10-CM

## 2022-04-21 RX ORDER — ACYCLOVIR 200 MG/5ML
400 SUSPENSION ORAL 3 TIMES DAILY
Qty: 300 ML | Refills: 0 | Status: SHIPPED | OUTPATIENT
Start: 2022-04-21 | End: 2022-05-01

## 2022-04-21 NOTE — TELEPHONE ENCOUNTER
Pt requests medication be sent to a different pharmacy  Pt stated that she is having nother flare up and experiences this often  Please advise    * Advised, PCP is out of the office, to be reviewed upon return  Medication:acyclovir (ZOVIRAX)  Dosage: 200 mg/5 mL oral suspension  How Often:Take 10 mL (400 mg total) by mouth 3 (three) times a day for 10 days  Quantity:  300mL  Last Office Visit: 7/19/2021  Next Office Visit: scheduled at old office   7/2022  Last refilled:11/2021  How many pills left: 0  Pharmacy:   [address] [place] [address] [address] [place] [id number]  Phone: [phone] Fax: [phone]

## 2023-10-25 NOTE — ASSESSMENT & PLAN NOTE
BMI is 25 33  She does feel that since covid started, she has been eating poorly, getting more take out  She does not have time for exercise but is not sedentary  Encouraged working on dietary balance  BMI Counseling: Body mass index is 25 33 kg/m²  The BMI is above normal  Nutrition recommendations include reducing portion sizes, decreasing overall calorie intake, 3-5 servings of fruits/vegetables daily and reducing fast food intake  Exercise recommendations include vigorous aerobic physical activity for 75 minutes/week  Initial Size Of Lesion: 0.7